# Patient Record
Sex: FEMALE | Race: WHITE
[De-identification: names, ages, dates, MRNs, and addresses within clinical notes are randomized per-mention and may not be internally consistent; named-entity substitution may affect disease eponyms.]

---

## 2017-03-03 ENCOUNTER — HOSPITAL ENCOUNTER (EMERGENCY)
Dept: HOSPITAL 80 - FED | Age: 52
LOS: 1 days | Discharge: HOME | End: 2017-03-04
Payer: COMMERCIAL

## 2017-03-03 VITALS — RESPIRATION RATE: 20 BRPM

## 2017-03-03 DIAGNOSIS — I10: ICD-10-CM

## 2017-03-03 DIAGNOSIS — Z87.891: ICD-10-CM

## 2017-03-03 DIAGNOSIS — J10.1: Primary | ICD-10-CM

## 2017-03-03 NOTE — CPEKG
Heart Rate: 71

RR Interval: 845

P-R Interval: 168

QRSD Interval: 86

QT Interval: 412

QTC Interval: 448

P Axis: 60

QRS Axis: -31

T Wave Axis: 145

EKG Severity - OTHERWISE NORMAL ECG -

EKG Impression: SINUS RHYTHM

EKG Impression: LEFT AXIS DEVIATION

Electronically Signed By: Kathie Pemberton 03-Mar-2017 23:06:26

## 2017-03-04 VITALS
TEMPERATURE: 98.1 F | SYSTOLIC BLOOD PRESSURE: 119 MMHG | HEART RATE: 74 BPM | DIASTOLIC BLOOD PRESSURE: 83 MMHG | OXYGEN SATURATION: 94 %

## 2017-03-04 NOTE — EDPHY
H & P


Stated Complaint: fever, cough, chills, chest pressure


Time Seen by Provider: 03/03/17 21:51


HPI/ROS: 





Chief complaint:  Cold symptoms





History of present illness:  This is a 51-year-old female who presents to the 

emergency department for cold symptoms. Patient reports the onset of symptoms 

over the last day.  She has had fever, sore throat, chest congestion and cough 

with green sputum.  She denies precipitating factors.  She denies alleviating 

factors.  She denies other associated signs or symptoms including no rash.





- Medical/Surgical History


Hx Asthma: No


Hx Chronic Respiratory Disease: No


Hx Diabetes: No


Hx Cardiac Disease: Yes


Hx Renal Disease: No


Hx Cirrhosis: No


Hx Alcoholism: No


Hx HIV/AIDS: No


Hx Splenectomy or Spleen Trauma: No


Other PMH: pmh- htn





- Social History


Smoking Status: Former smoker





- Physical Exam


Exam: 





General Appearance:  Alert and no distress.


Eyes:  Pupils equal and round no injection.


ENT: Tympanic membranes, external auditory canals, external ears and 

surrounding soft tissue including over the mastoids are unremarkable.  

Nasopharynx is not injected.  There is no rhinorrhea.  Oropharynx is mildly 

injected.  There is no edema.  There is no exudate.  There is no asymmetry.  

The uvula is midline. No elevation of the tongue.  There is no hoarseness, no 

drooling, no trismus, no stridor.


Respiratory:  Chest is nontender, lungs are clear to auscultation.


Cardiac:  regular rate and rhythm.


Musculoskeletal:  Neck is supple and nontender. Extremities have full range of 

motion and are nontender.


Skin:  No rashes or lesions.


Neurological:  Alert and oriented x4. No meningismus.





Constitutional: 


 Initial Vital Signs











Temperature (C)  38.5 C H  03/03/17 21:31


 


Heart Rate  81   03/03/17 21:31


 


Respiratory Rate  20   03/03/17 21:31


 


Blood Pressure  135/105 H  03/03/17 21:31


 


O2 Sat (%)  93   03/03/17 21:31








 











O2 Delivery Mode               Room Air














Allergies/Adverse Reactions: 


 





amoxicillin Allergy (Verified 03/03/17 21:31)


 








Home Medications: 














 Medication  Instructions  Recorded


 


Lisinopril/Hctz 20/12.5MG 1 ea PO DAILY #30 tab 03/09/16





[Zestoretic/Prinzide 20/12.5MG (*)]  


 


Atenolol  03/03/17


 


Oseltamivir Phosphate [Tamiflu 75 75 mg PO BID #9 cap 03/04/17





mg (*)]  














Medical Decision Making


ED Course/Re-evaluation: 





Patient seen under the supervision of my secondary supervising physician Dr. Enrique Moore.  Patient presents to the emergency department for cold symptoms. 

On presentation she is nontoxic.  Chest x-ray negative.  Influenza A positive 

by swab.  She is within treatment time frame.  I have discussed risks and 

benefits of Tamiflu treatment and she would like to start treatment.  She is 

discharged home.  Home care is discussed.  She is asked to follow up with her 

primary care doctor next week for recheck.  Return precautions are given.  

Patient voiced understanding and agreement with plan.


Differential Diagnosis: 





Included but not limited to bronchitis, pneumonia, influenza, upper respiratory 

tract infection





- Data Points


Laboratory Results: 


 











  03/03/17





  22:15


 


Influenza A & B (PCR)  POSITIVE FOR FLU A  H 





   (NEGATIVE) 











Medications Given: 


 








Discontinued Medications





Ibuprofen (Motrin)  600 mg PO EDNOW ONE


   Stop: 03/03/17 21:53


   Last Admin: 03/03/17 21:55 Dose:  600 mg








Departure





- Departure


Disposition: Home, Routine, Self-Care


Clinical Impression: 


 Influenza A





Condition: Good


Instructions:  Influenza (ED)


Additional Instructions: 


Follow-up with your primary care doctor next week for recheck





If symptoms worsen or new symptoms develop return to the emergency department 

for recheck


Referrals: 


Latosha Lopez MD [Primary Care Provider] - As per Instructions


Prescriptions: 


Oseltamivir Phosphate [Tamiflu 75 mg (*)] 75 mg PO BID #9 cap

## 2018-04-13 ENCOUNTER — HOSPITAL ENCOUNTER (OUTPATIENT)
Dept: HOSPITAL 80 - BRMIMAGING | Age: 53
End: 2018-04-13
Attending: INTERNAL MEDICINE
Payer: COMMERCIAL

## 2018-04-13 DIAGNOSIS — R92.8: Primary | ICD-10-CM
